# Patient Record
Sex: FEMALE | Race: WHITE | NOT HISPANIC OR LATINO | Employment: OTHER | URBAN - METROPOLITAN AREA
[De-identification: names, ages, dates, MRNs, and addresses within clinical notes are randomized per-mention and may not be internally consistent; named-entity substitution may affect disease eponyms.]

---

## 2022-06-10 ENCOUNTER — OFFICE VISIT (OUTPATIENT)
Dept: GYNECOLOGY | Facility: CLINIC | Age: 47
End: 2022-06-10
Payer: COMMERCIAL

## 2022-06-10 ENCOUNTER — LAB VISIT (OUTPATIENT)
Dept: LAB | Facility: HOSPITAL | Age: 47
End: 2022-06-10
Attending: OBSTETRICS & GYNECOLOGY
Payer: COMMERCIAL

## 2022-06-10 VITALS
SYSTOLIC BLOOD PRESSURE: 127 MMHG | DIASTOLIC BLOOD PRESSURE: 88 MMHG | OXYGEN SATURATION: 99 % | TEMPERATURE: 99 F | HEART RATE: 69 BPM | HEIGHT: 61 IN

## 2022-06-10 DIAGNOSIS — Z12.31 VISIT FOR SCREENING MAMMOGRAM: Primary | ICD-10-CM

## 2022-06-10 DIAGNOSIS — R10.2 PELVIC PAIN: ICD-10-CM

## 2022-06-10 DIAGNOSIS — Z01.419 ROUTINE GYNECOLOGICAL EXAMINATION: ICD-10-CM

## 2022-06-10 DIAGNOSIS — N95.1 MENOPAUSAL SYMPTOMS: ICD-10-CM

## 2022-06-10 LAB
ALBUMIN SERPL-MCNC: 4.3 GM/DL (ref 3.5–5)
ALBUMIN/GLOB SERPL: 1.4 RATIO (ref 1.1–2)
ALP SERPL-CCNC: 55 UNIT/L (ref 40–150)
ALT SERPL-CCNC: 21 UNIT/L (ref 0–55)
AST SERPL-CCNC: 24 UNIT/L (ref 5–34)
BASOPHILS # BLD AUTO: 0.04 X10(3)/MCL (ref 0–0.2)
BASOPHILS NFR BLD AUTO: 0.7 %
BILIRUBIN DIRECT+TOT PNL SERPL-MCNC: 0.5 MG/DL
BUN SERPL-MCNC: 15.1 MG/DL (ref 7–18.7)
CALCIUM SERPL-MCNC: 9.8 MG/DL (ref 8.4–10.2)
CHLORIDE SERPL-SCNC: 101 MMOL/L (ref 98–107)
CHOLEST SERPL-MCNC: 188 MG/DL
CHOLEST/HDLC SERPL: 2 {RATIO} (ref 0–5)
CO2 SERPL-SCNC: 28 MMOL/L (ref 22–29)
CREAT SERPL-MCNC: 0.72 MG/DL (ref 0.55–1.02)
EOSINOPHIL # BLD AUTO: 0.05 X10(3)/MCL (ref 0–0.9)
EOSINOPHIL NFR BLD AUTO: 0.9 %
ERYTHROCYTE [DISTWIDTH] IN BLOOD BY AUTOMATED COUNT: 11.7 % (ref 11.5–17)
FSH SERPL-ACNC: 29.84 MIU/ML
GLOBULIN SER-MCNC: 3.1 GM/DL (ref 2.4–3.5)
GLUCOSE SERPL-MCNC: 90 MG/DL (ref 74–100)
HCT VFR BLD AUTO: 40.7 % (ref 37–47)
HDLC SERPL-MCNC: 89 MG/DL (ref 35–60)
HGB BLD-MCNC: 13.7 GM/DL (ref 12–16)
IMM GRANULOCYTES # BLD AUTO: 0 X10(3)/MCL (ref 0–0.02)
IMM GRANULOCYTES NFR BLD AUTO: 0 % (ref 0–0.43)
LDLC SERPL CALC-MCNC: 87 MG/DL (ref 50–140)
LYMPHOCYTES # BLD AUTO: 2.09 X10(3)/MCL (ref 0.6–4.6)
LYMPHOCYTES NFR BLD AUTO: 38.7 %
MCH RBC QN AUTO: 31.3 PG (ref 27–31)
MCHC RBC AUTO-ENTMCNC: 33.7 MG/DL (ref 33–36)
MCV RBC AUTO: 92.9 FL (ref 80–94)
MONOCYTES # BLD AUTO: 0.56 X10(3)/MCL (ref 0.1–1.3)
MONOCYTES NFR BLD AUTO: 10.4 %
NEUTROPHILS # BLD AUTO: 2.7 X10(3)/MCL (ref 2.1–9.2)
NEUTROPHILS NFR BLD AUTO: 49.3 %
NRBC BLD AUTO-RTO: 0 %
PLATELET # BLD AUTO: 268 X10(3)/MCL (ref 130–400)
PMV BLD AUTO: 9.9 FL (ref 9.4–12.4)
POTASSIUM SERPL-SCNC: 4 MMOL/L (ref 3.5–5.1)
PROT SERPL-MCNC: 7.4 GM/DL (ref 6.4–8.3)
RBC # BLD AUTO: 4.38 X10(6)/MCL (ref 4.2–5.4)
SODIUM SERPL-SCNC: 141 MMOL/L (ref 136–145)
TRIGL SERPL-MCNC: 58 MG/DL (ref 37–140)
TSH SERPL-ACNC: 0.6 UIU/ML (ref 0.35–4.94)
VLDLC SERPL CALC-MCNC: 12 MG/DL
WBC # SPEC AUTO: 5.4 X10(3)/MCL (ref 4.5–11.5)

## 2022-06-10 PROCEDURE — 85025 COMPLETE CBC W/AUTO DIFF WBC: CPT

## 2022-06-10 PROCEDURE — 84443 ASSAY THYROID STIM HORMONE: CPT

## 2022-06-10 PROCEDURE — 87624 HPV HI-RISK TYP POOLED RSLT: CPT | Performed by: OBSTETRICS & GYNECOLOGY

## 2022-06-10 PROCEDURE — 83001 ASSAY OF GONADOTROPIN (FSH): CPT

## 2022-06-10 PROCEDURE — 99214 OFFICE O/P EST MOD 30 MIN: CPT | Mod: PBBFAC | Performed by: OBSTETRICS & GYNECOLOGY

## 2022-06-10 PROCEDURE — 80053 COMPREHEN METABOLIC PANEL: CPT

## 2022-06-10 PROCEDURE — 80061 LIPID PANEL: CPT

## 2022-06-10 PROCEDURE — 36415 COLL VENOUS BLD VENIPUNCTURE: CPT

## 2022-06-10 NOTE — PROGRESS NOTES
Subjective:       Patient ID: Waleska Foley is a 47 y.o. female.    Chief Complaint:  Well Woman (C/o previous symptoms coming back )    History of Present Illness:  Presents for gyn wellness visit with complaints.  She had relief from pelvic pain after lysis of adhesions involving the uterus and the anterior abdominal wall, but for 2 months has had intermittent episodes of pelvic pain which resolves spontaneously, dyspareunia and rectal pressure.  She strongly desires to avoid more surgery.  Periods are absent since the endometrial ablation. She denies abnormal bleeding, vaginal discharge, breast masses or other changes of concern.     GYN & OB History  No LMP recorded. (Menstrual status: Other).   Date of Last Pap: No result found    OB History    Para Term  AB Living   4 1     2 2   SAB IAB Ectopic Multiple Live Births   2       2      # Outcome Date GA Lbr Dain/2nd Weight Sex Delivery Anes PTL Lv   4       CS-Unspec   KENAN   3 SAB            2 SAB            1 Para      CS-Unspec   KENAN       Vitals:    06/10/22 1118   BP: 127/88   Pulse: 69   Resp: (P) 18   Temp: 98.8 °F (37.1 °C)        Past Surgical History:   Procedure Laterality Date    CHOLECYSTECTOMY      REDUCTION OF BOTH BREASTS      Hysteroscopy, surgical; with endometrial ablation (eg, endometrial resection, electrosurgical ablation, thermoablation)      Laparoscopy with lysis of adhesions        No current outpatient medications on file.     Review of patient's allergies indicates:   Allergen Reactions    Codeine Anaphylaxis    Sulfa (sulfonamide antibiotics) Anaphylaxis        Social History     Socioeconomic History    Marital status:    Tobacco Use    Smoking status: Never Smoker    Smokeless tobacco: Never Used   Substance and Sexual Activity    Alcohol use: Yes     Comment: occasionally     Drug use: Never    Sexual activity: Yes     Partners: Male        Immunization History   Administered Date(s)  Administered    Influenza - Quadrivalent - PF *Preferred* (6 months and older) 10/26/2015        There are no preventive care reminders to display for this patient.     Review of Systems  Review of Systems        Objective:    Physical Exam:   Constitutional: She is oriented to person, place, and time. She appears well-developed and well-nourished.    HENT:   Head: Normocephalic.    Eyes: Pupils are equal, round, and reactive to light. EOM are normal.    Neck: No thyromegaly present.    Cardiovascular: Normal rate, regular rhythm and normal heart sounds.    No murmur heard.   Pulmonary/Chest: Effort normal and breath sounds normal. Right breast exhibits no mass, no nipple discharge, no skin change and no tenderness. Left breast exhibits no mass, no nipple discharge, no skin change and no tenderness. Breasts are symmetrical.        Abdominal: Soft. She exhibits no distension. There is no abdominal tenderness.     Genitourinary:    Vagina and uterus normal.   The external female genitalia was normal.   Labial bartholins normal.There is no lesion on the right labia. There is no lesion on the left labia. Cervix is normal. Right adnexum displays no mass and no tenderness. Left adnexum displays no mass and no tenderness. Vagina exhibits no lesion. No erythema, rectocele, cystocele or unspecified prolapse of vaginal walls in the vagina. Cervix exhibits no lesion, no discharge, no friability, no lesion and no tenderness. Uerus contour normal  Uterus is not fixed. Normal urethral meatus.Bladder findings: no bladder tenderness          Musculoskeletal: Normal range of motion. No edema.      Lymphadenopathy:     She has no cervical adenopathy.    Neurological: She is alert and oriented to person, place, and time.    Skin: Skin is warm and dry. No rash noted.    Psychiatric: She has a normal mood and affect.          Assessment:        1. Visit for screening mammogram    2. Routine gynecological examination    3. Pelvic pain     4. Menopausal symptoms                Plan:      Problem List Items Addressed This Visit    None     Visit Diagnoses     Visit for screening mammogram    -  Primary    Relevant Orders    Mammo Digital Screening Bilat    Routine gynecological examination        Relevant Orders    Liquid-Based Pap Smear, Screening Screening    CBC Auto Differential    Comprehensive Metabolic Panel    Lipid Panel    TSH    Pelvic pain        Relevant Orders    US Pelvis Complete Non OB    Menopausal symptoms        Relevant Orders    Follicle Stimulating Hormone             Follow up in 1 year (on 6/10/2023).   Phone with lab and  Ultrasound results

## 2022-06-14 ENCOUNTER — HOSPITAL ENCOUNTER (OUTPATIENT)
Dept: RADIOLOGY | Facility: HOSPITAL | Age: 47
Discharge: HOME | End: 2022-06-14
Attending: OBSTETRICS & GYNECOLOGY
Payer: COMMERCIAL

## 2022-06-14 DIAGNOSIS — R10.2 PELVIC PAIN: ICD-10-CM

## 2022-06-14 PROCEDURE — 76856 US EXAM PELVIC COMPLETE: CPT | Mod: TC

## 2022-06-16 LAB
HPV16+18+H RISK 12 DNA CVX-IMP: NEGATIVE
INSULIN SERPL-ACNC: NORMAL U[IU]/ML
LAB AP BETHESDA CATEGORY: NORMAL
LAB AP GYN ADDITIONAL FINDINGS: NORMAL
LAB AP GYN MOLECULAR TESTING: NORMAL
LAB AP LMP DATE: NORMAL
LAB AP OCHS PAP SPECIMEN ADEQUACY: NORMAL
LAB AP OHS PAP INTERPRETATION: NORMAL
LAB AP PAP DISCLAIMER COMMENTS: NORMAL
LAB AP PAP ESTROGEN REPLACEMENT THERAPY: NORMAL

## 2022-11-17 ENCOUNTER — TELEPHONE (OUTPATIENT)
Dept: GYNECOLOGY | Facility: CLINIC | Age: 47
End: 2022-11-17
Payer: COMMERCIAL

## 2022-11-17 NOTE — TELEPHONE ENCOUNTER
----- Message from Dina Paul sent at 11/17/2022  2:39 PM CST -----  Regarding: ORDERS  Pt needs MAMMO orders sent to [facility] in [place] ASAP. Appt schd for tomorrow 11/18/22

## 2023-06-16 ENCOUNTER — OFFICE VISIT (OUTPATIENT)
Dept: GYNECOLOGY | Facility: CLINIC | Age: 48
End: 2023-06-16
Payer: COMMERCIAL

## 2023-06-16 VITALS
DIASTOLIC BLOOD PRESSURE: 79 MMHG | BODY MASS INDEX: 19.83 KG/M2 | HEART RATE: 62 BPM | TEMPERATURE: 99 F | SYSTOLIC BLOOD PRESSURE: 115 MMHG | HEIGHT: 61 IN | RESPIRATION RATE: 20 BRPM | WEIGHT: 105 LBS | OXYGEN SATURATION: 98 %

## 2023-06-16 DIAGNOSIS — Z01.419 ROUTINE GYNECOLOGICAL EXAMINATION: Primary | ICD-10-CM

## 2023-06-16 DIAGNOSIS — Z12.31 VISIT FOR SCREENING MAMMOGRAM: ICD-10-CM

## 2023-06-16 PROCEDURE — 3008F PR BODY MASS INDEX (BMI) DOCUMENTED: ICD-10-PCS | Mod: CPTII,,, | Performed by: OBSTETRICS & GYNECOLOGY

## 2023-06-16 PROCEDURE — 1159F MED LIST DOCD IN RCRD: CPT | Mod: CPTII,,, | Performed by: OBSTETRICS & GYNECOLOGY

## 2023-06-16 PROCEDURE — 3074F PR MOST RECENT SYSTOLIC BLOOD PRESSURE < 130 MM HG: ICD-10-PCS | Mod: CPTII,,, | Performed by: OBSTETRICS & GYNECOLOGY

## 2023-06-16 PROCEDURE — 3074F SYST BP LT 130 MM HG: CPT | Mod: CPTII,,, | Performed by: OBSTETRICS & GYNECOLOGY

## 2023-06-16 PROCEDURE — 1159F PR MEDICATION LIST DOCUMENTED IN MEDICAL RECORD: ICD-10-PCS | Mod: CPTII,,, | Performed by: OBSTETRICS & GYNECOLOGY

## 2023-06-16 PROCEDURE — 99396 PR PREVENTIVE VISIT,EST,40-64: ICD-10-PCS | Mod: S$PBB,,, | Performed by: OBSTETRICS & GYNECOLOGY

## 2023-06-16 PROCEDURE — 1160F PR REVIEW ALL MEDS BY PRESCRIBER/CLIN PHARMACIST DOCUMENTED: ICD-10-PCS | Mod: CPTII,,, | Performed by: OBSTETRICS & GYNECOLOGY

## 2023-06-16 PROCEDURE — 3078F DIAST BP <80 MM HG: CPT | Mod: CPTII,,, | Performed by: OBSTETRICS & GYNECOLOGY

## 2023-06-16 PROCEDURE — 99396 PREV VISIT EST AGE 40-64: CPT | Mod: S$PBB,,, | Performed by: OBSTETRICS & GYNECOLOGY

## 2023-06-16 PROCEDURE — 3008F BODY MASS INDEX DOCD: CPT | Mod: CPTII,,, | Performed by: OBSTETRICS & GYNECOLOGY

## 2023-06-16 PROCEDURE — 99214 OFFICE O/P EST MOD 30 MIN: CPT | Mod: PBBFAC | Performed by: OBSTETRICS & GYNECOLOGY

## 2023-06-16 PROCEDURE — 1160F RVW MEDS BY RX/DR IN RCRD: CPT | Mod: CPTII,,, | Performed by: OBSTETRICS & GYNECOLOGY

## 2023-06-16 PROCEDURE — 3078F PR MOST RECENT DIASTOLIC BLOOD PRESSURE < 80 MM HG: ICD-10-PCS | Mod: CPTII,,, | Performed by: OBSTETRICS & GYNECOLOGY

## 2023-06-16 NOTE — PROGRESS NOTES
Subjective:       Patient ID: Waleska Foley is a 48 y.o. female.    Chief Complaint:  annual     History of Present Illness:  Presents for gyn wellness visit without complaints.  Periods are absent.  She denies vaginal discharge, breast masses or other changes of concern. Desires screening labs.    GYN & OB History  No LMP recorded. Patient has had an ablation.   Date of Last Pap: 2022    OB History    Para Term  AB Living   5 2     2 2   SAB IAB Ectopic Multiple Live Births   2       2      # Outcome Date GA Lbr Dain/2nd Weight Sex Delivery Anes PTL Lv   5 Para            4       CS-Unspec   KENAN   3 SAB            2 SAB            1 Para      CS-Unspec   KENAN       Past Medical History:   Diagnosis Date    Antiphospholipid syndrome       Past Surgical History:   Procedure Laterality Date    CHOLECYSTECTOMY      REDUCTION OF BOTH BREASTS      Hysteroscopy, surgical; with endometrial ablation (eg, endometrial resection, electrosurgical ablation, thermoablation)      Laparoscopy with lysis of adhesions          Current Outpatient Medications:     CMPD testosterone proprionate 2% in vanicream, Apply 10 mg topically Daily., Disp: , Rfl:      Review of patient's allergies indicates:   Allergen Reactions    Codeine Anaphylaxis    Sulfa (sulfonamide antibiotics) Anaphylaxis        Social History     Socioeconomic History    Marital status:    Tobacco Use    Smoking status: Never    Smokeless tobacco: Never   Substance and Sexual Activity    Alcohol use: Not Currently    Drug use: Never    Sexual activity: Yes     Partners: Male        Immunization History   Administered Date(s) Administered    Influenza - Quadrivalent - PF *Preferred* (6 months and older) 10/26/2015        There are no preventive care reminders to display for this patient.     Review of Systems  Review of Systems       Objective:     Vitals:    23 1000   BP: 115/79   Pulse: 62   Resp: 20   Temp: 98.9 °F (37.2 °C)        Physical Exam:   Constitutional: She is oriented to person, place, and time. She appears well-developed and well-nourished.    HENT:   Head: Normocephalic.    Eyes: Pupils are equal, round, and reactive to light. EOM are normal.    Neck: No thyromegaly present.    Cardiovascular:  Normal rate, regular rhythm and normal heart sounds.            No murmur heard.   Pulmonary/Chest: Effort normal and breath sounds normal. Right breast exhibits no mass, no nipple discharge, no skin change and no tenderness. Left breast exhibits no mass, no nipple discharge, no skin change and no tenderness. Breasts are symmetrical.        Abdominal: Soft. She exhibits no distension. There is no abdominal tenderness.     Genitourinary:    Vagina and uterus normal.   The external female genitalia was normal.   Labial bartholins normal.There is no lesion on the right labia. There is no lesion on the left labia. Cervix is normal. Right adnexum displays no mass and no tenderness. Left adnexum displays no mass and no tenderness. Vagina exhibits no lesion. No erythema, rectocele, cystocele or unspecified prolapse of vaginal walls in the vagina. Cervix exhibits no lesion, no discharge, no friability, no lesion and no tenderness. Uerus contour normal  Uterus is not fixed. Normal urethral meatus.Bladder findings: no bladder tenderness          Musculoskeletal: Normal range of motion. No edema.      Lymphadenopathy:     She has no cervical adenopathy.    Neurological: She is alert and oriented to person, place, and time.    Skin: Skin is warm and dry. No rash noted.    Psychiatric: She has a normal mood and affect.        Assessment:        1. Routine gynecological examination    2. Visit for screening mammogram                Plan:      Problem List Items Addressed This Visit    None  Visit Diagnoses       Routine gynecological examination    -  Primary    Relevant Orders    CBC Auto Differential    Comprehensive Metabolic Panel    TSH    Lipid  Panel    Visit for screening mammogram        Relevant Orders    Mammo Digital Screening Bilat w/ Carlos               Follow up in 1 year (on 6/16/2024).   VOLODYMYR

## 2023-12-26 ENCOUNTER — TELEPHONE (OUTPATIENT)
Dept: GYNECOLOGY | Facility: CLINIC | Age: 48
End: 2023-12-26
Payer: COMMERCIAL

## 2023-12-26 NOTE — TELEPHONE ENCOUNTER
Tried calling patient, left detailed vm.       ----- Message from Bernarda Marvin sent at 12/26/2023  9:10 AM CST -----  Regarding: Patient call back  The patient above called because she is having pelvic pain again. She asked for Dr. Santos to give her a call if possible. Please advise, Thanks

## 2023-12-29 ENCOUNTER — TELEPHONE (OUTPATIENT)
Dept: GYNECOLOGY | Facility: CLINIC | Age: 48
End: 2023-12-29
Payer: COMMERCIAL

## 2023-12-29 NOTE — TELEPHONE ENCOUNTER
"Patient called back. C/o of chronic pelvic pain last for the past couple of months. Describes a "shooting pain" in her pelvic area. Has noticed that her lower back is also starting to hurt. Patient also mentioned a "pulling sensation" in her buttock area "on the side". Patient also c/o bladder/rectum pressure as well as urinary frequency. Denies burning or pain upon urination. Patient mentioned that Dr. Santos previously went in a removed scar tissue so she is thinking that is what's happening again.     I scheduled patient to come in for an appt on 1/4/24 at 1:20 pm. Patient states she will attend the appt.     "

## 2023-12-29 NOTE — TELEPHONE ENCOUNTER
Wants Mobic Rx  Received: Today  Muna Batista Lillian, LPN  Patient is asking for Dr. Santos to send in a rx of mobic. Ibuprofen/advil is too hard on her stomach. She has been prescribed this before. She is in severe pelvic pain, level 10.    Phone is [phone]. Please contact patient once Dr. Santos approves.

## 2024-01-02 RX ORDER — MELOXICAM 15 MG/1
15 TABLET ORAL DAILY
Qty: 7 TABLET | Refills: 0 | Status: SHIPPED | OUTPATIENT
Start: 2024-01-02

## 2024-01-04 ENCOUNTER — PATIENT MESSAGE (OUTPATIENT)
Dept: GYNECOLOGY | Facility: CLINIC | Age: 49
End: 2024-01-04
Payer: COMMERCIAL

## 2024-01-05 RX ORDER — MELOXICAM 15 MG/1
15 TABLET ORAL DAILY
Qty: 14 TABLET | Refills: 0 | Status: SHIPPED | OUTPATIENT
Start: 2024-01-05 | End: 2024-02-01 | Stop reason: SDUPTHER

## 2024-02-01 ENCOUNTER — OFFICE VISIT (OUTPATIENT)
Dept: GYNECOLOGY | Facility: CLINIC | Age: 49
End: 2024-02-01
Payer: COMMERCIAL

## 2024-02-01 VITALS
HEART RATE: 70 BPM | OXYGEN SATURATION: 100 % | BODY MASS INDEX: 20.77 KG/M2 | TEMPERATURE: 99 F | HEIGHT: 61 IN | WEIGHT: 110 LBS | SYSTOLIC BLOOD PRESSURE: 120 MMHG | RESPIRATION RATE: 18 BRPM | DIASTOLIC BLOOD PRESSURE: 77 MMHG

## 2024-02-01 DIAGNOSIS — R10.2 PELVIC PAIN: Primary | ICD-10-CM

## 2024-02-01 PROCEDURE — 1159F MED LIST DOCD IN RCRD: CPT | Mod: CPTII,,, | Performed by: OBSTETRICS & GYNECOLOGY

## 2024-02-01 PROCEDURE — 3078F DIAST BP <80 MM HG: CPT | Mod: CPTII,,, | Performed by: OBSTETRICS & GYNECOLOGY

## 2024-02-01 PROCEDURE — 99213 OFFICE O/P EST LOW 20 MIN: CPT | Mod: PBBFAC | Performed by: OBSTETRICS & GYNECOLOGY

## 2024-02-01 PROCEDURE — 3008F BODY MASS INDEX DOCD: CPT | Mod: CPTII,,, | Performed by: OBSTETRICS & GYNECOLOGY

## 2024-02-01 PROCEDURE — 3074F SYST BP LT 130 MM HG: CPT | Mod: CPTII,,, | Performed by: OBSTETRICS & GYNECOLOGY

## 2024-02-01 PROCEDURE — 99213 OFFICE O/P EST LOW 20 MIN: CPT | Mod: S$PBB,,, | Performed by: OBSTETRICS & GYNECOLOGY

## 2024-02-01 RX ORDER — PROGESTERONE 100 MG/1
100 CAPSULE ORAL NIGHTLY
COMMUNITY
Start: 2024-01-22 | End: 2024-02-09 | Stop reason: SDUPTHER

## 2024-02-01 RX ORDER — MELOXICAM 15 MG/1
15 TABLET ORAL DAILY
Qty: 30 TABLET | Refills: 0 | Status: ON HOLD | OUTPATIENT
Start: 2024-02-01 | End: 2024-02-28 | Stop reason: HOSPADM

## 2024-02-01 RX ORDER — SULFAMETHOXAZOLE AND TRIMETHOPRIM 800; 160 MG/1; MG/1
60 TABLET ORAL
COMMUNITY
Start: 2023-12-13

## 2024-02-01 NOTE — PROGRESS NOTES
Subjective:       Patient ID: Waleska Foley is a 48 y.o. female.    Chief Complaint:  Pelvic Pain    History of Present Illness:  Presents with return of lower abdominal/pelvic, hip and low back pain over the last 4-5 months.  She underwent laparoscopic lysis of a thick adhesion between the uterine fundus and the anterior abdominal wall in 2021 and did well until last September or October.  The pain is constant with intermittent exacerbation when it becomes severe.  She notes it worsens with physical activity such as exercise, standing, intercourse, etc.  She saw a therapist who employed various modalities such as dry needling, laser, etc which only provided short term relief.  She feels that she needs to return to surgery because the pain has been persistent.  She does not desire hysterectomy at this point.  She agreed to do pelvic ultrasound prior to finalizing plans.    GYN & OB History  No LMP recorded. Patient has had an ablation.   Date of Last Pap: 2022    OB History    Para Term  AB Living   5 2     2 2   SAB IAB Ectopic Multiple Live Births   2       2      # Outcome Date GA Lbr Dain/2nd Weight Sex Delivery Anes PTL Lv   5 Para            4       CS-Unspec   KENAN   3 SAB            2 SAB            1 Para      CS-Unspec   KENAN       Past Medical History:   Diagnosis Date    Antiphospholipid syndrome       Past Surgical History:   Procedure Laterality Date    CHOLECYSTECTOMY      REDUCTION OF BOTH BREASTS      Hysteroscopy, surgical; with endometrial ablation (eg, endometrial resection, electrosurgical ablation, thermoablation)      Laparoscopy with lysis of adhesions          Current Outpatient Medications:     ARMOUR THYROID 60 mg Tab, Take 60 mg by mouth., Disp: , Rfl:     progesterone (PROMETRIUM) 100 MG capsule, Take 100 mg by mouth every evening., Disp: , Rfl:     CMPD testosterone proprionate 2% in vanicream, Apply 10 mg topically Daily., Disp: , Rfl:     meloxicam  (MOBIC) 15 MG tablet, Take 1 tablet (15 mg total) by mouth once daily. (Patient not taking: Reported on 2/1/2024), Disp: 7 tablet, Rfl: 0    meloxicam (MOBIC) 15 MG tablet, Take 1 tablet (15 mg total) by mouth once daily., Disp: 30 tablet, Rfl: 0     Review of patient's allergies indicates:   Allergen Reactions    Codeine Anaphylaxis    Sulfa (sulfonamide antibiotics) Anaphylaxis        Social History     Socioeconomic History    Marital status:    Tobacco Use    Smoking status: Never    Smokeless tobacco: Never   Substance and Sexual Activity    Alcohol use: Not Currently    Drug use: Never    Sexual activity: Yes     Partners: Male        Immunization History   Administered Date(s) Administered    Influenza - Quadrivalent - PF *Preferred* (6 months and older) 10/26/2015        There are no preventive care reminders to display for this patient.     Review of Systems  Review of Systems       Objective:     Vitals:    02/01/24 1421   BP: 120/77   Pulse: 70   Resp: 18   Temp: 98.5 °F (36.9 °C)       Physical Exam      Deferred until later date/pre op     Assessment:        1. Pelvic pain                Plan:      Problem List Items Addressed This Visit    None  Visit Diagnoses       Pelvic pain    -  Primary    Relevant Orders    US Pelvis Comp with Transvag NON-OB (xpd           Medications Ordered This Encounter   Medications    meloxicam (MOBIC) 15 MG tablet     Sig: Take 1 tablet (15 mg total) by mouth once daily.     Dispense:  30 tablet     Refill:  0      No follow-ups on file.   SBE     Reviewed potential for normal findings in the pelvis if we perform another laparoscopy.  I feel she would have a good chance at long term relief with hysterectomy, but she is very hesitant.  Will revisit options and plan once ultrasound results are in.

## 2024-02-07 ENCOUNTER — HOSPITAL ENCOUNTER (OUTPATIENT)
Dept: RADIOLOGY | Facility: HOSPITAL | Age: 49
Discharge: HOME | End: 2024-02-07
Attending: OBSTETRICS & GYNECOLOGY
Payer: COMMERCIAL

## 2024-02-07 DIAGNOSIS — R10.2 PELVIC PAIN: ICD-10-CM

## 2024-02-07 PROCEDURE — 76830 TRANSVAGINAL US NON-OB: CPT | Mod: TC

## 2024-02-09 RX ORDER — PROGESTERONE 100 MG/1
200 CAPSULE ORAL NIGHTLY
Qty: 30 CAPSULE | Refills: 11 | Status: ON HOLD | OUTPATIENT
Start: 2024-02-09 | End: 2024-02-28 | Stop reason: HOSPADM

## 2024-02-09 NOTE — TELEPHONE ENCOUNTER
Reviewed pelvic ultrasound result. Findings consistent with possible pelvic congestion although she has had two similar episodes in the past, both resolved with lysis of adhesions.  Will initiate conservative management of PCS with progesterone.  She is currently on Prometrium 100 q HS. Will increase to 200 qHS. If no improvement, consider repeat laparoscopy.

## 2024-02-15 ENCOUNTER — TELEPHONE (OUTPATIENT)
Dept: GYNECOLOGY | Facility: CLINIC | Age: 49
End: 2024-02-15
Payer: COMMERCIAL

## 2024-02-15 NOTE — TELEPHONE ENCOUNTER
Pelvic ultrasound was completed. Plan was to revisit her options once ultrasound results are back. Please advise. Thank you.       ----- Message from Bernarda Marvin sent at 2/15/2024  2:19 PM CST -----  Regarding: Patient Call Back  The patient above called because she is still having pelvic pain, and is requesting a call back. Please advise, Thanks.

## 2024-02-16 ENCOUNTER — PATIENT MESSAGE (OUTPATIENT)
Dept: GYNECOLOGY | Facility: CLINIC | Age: 49
End: 2024-02-16
Payer: COMMERCIAL

## 2024-02-16 ENCOUNTER — TELEPHONE (OUTPATIENT)
Dept: GYNECOLOGY | Facility: CLINIC | Age: 49
End: 2024-02-16
Payer: COMMERCIAL

## 2024-02-16 DIAGNOSIS — R10.2 PELVIC PAIN: Primary | ICD-10-CM

## 2024-02-16 NOTE — TELEPHONE ENCOUNTER
Phoned with progressive pelvic pressure and pain with radiation down the thighs.  She has not had relief with increased dose of Prometrium and Mobic.  She has exacerbation of pain in the upright position and relief with lying down.  After a review of ultrasound findings and discussion of option to perform CT or move forward with surgery, she desires to do the latter and requests hysterectomy.  She has had two laparoscopic lysis of adhesions in the past and feels similar pain and feels like hysterectomy is the best way to reduce the risk of future surgery.  I agree that she will likely get relief with a hysterectomy, although a guarantee cannot be made.  She understands.  She requests the procedure as soon as possible.

## 2024-02-22 ENCOUNTER — LAB VISIT (OUTPATIENT)
Dept: LAB | Facility: HOSPITAL | Age: 49
End: 2024-02-22
Attending: OBSTETRICS & GYNECOLOGY
Payer: COMMERCIAL

## 2024-02-22 ENCOUNTER — OFFICE VISIT (OUTPATIENT)
Dept: GYNECOLOGY | Facility: CLINIC | Age: 49
End: 2024-02-22
Payer: COMMERCIAL

## 2024-02-22 VITALS
HEIGHT: 61 IN | DIASTOLIC BLOOD PRESSURE: 74 MMHG | BODY MASS INDEX: 22.47 KG/M2 | SYSTOLIC BLOOD PRESSURE: 109 MMHG | HEART RATE: 63 BPM | RESPIRATION RATE: 18 BRPM | WEIGHT: 119 LBS | OXYGEN SATURATION: 98 % | TEMPERATURE: 98 F

## 2024-02-22 DIAGNOSIS — R10.2 PELVIC PAIN: Primary | ICD-10-CM

## 2024-02-22 DIAGNOSIS — N94.89 PELVIC CONGESTION: ICD-10-CM

## 2024-02-22 DIAGNOSIS — R10.2 PELVIC PAIN: ICD-10-CM

## 2024-02-22 DIAGNOSIS — N94.10 DYSPAREUNIA IN FEMALE: ICD-10-CM

## 2024-02-22 LAB
ABORH RETYPE: NORMAL
BASOPHILS # BLD AUTO: 0.04 X10(3)/MCL
BASOPHILS NFR BLD AUTO: 0.7 %
EOSINOPHIL # BLD AUTO: 0.2 X10(3)/MCL (ref 0–0.9)
EOSINOPHIL NFR BLD AUTO: 3.5 %
ERYTHROCYTE [DISTWIDTH] IN BLOOD BY AUTOMATED COUNT: 12 % (ref 11.5–17)
GROUP & RH: NORMAL
HCT VFR BLD AUTO: 40.4 % (ref 37–47)
HGB BLD-MCNC: 13.5 G/DL (ref 12–16)
IMM GRANULOCYTES # BLD AUTO: 0.02 X10(3)/MCL (ref 0–0.04)
IMM GRANULOCYTES NFR BLD AUTO: 0.4 %
INDIRECT COOMBS: NORMAL
LYMPHOCYTES # BLD AUTO: 2.3 X10(3)/MCL (ref 0.6–4.6)
LYMPHOCYTES NFR BLD AUTO: 40.4 %
MCH RBC QN AUTO: 31.7 PG (ref 27–31)
MCHC RBC AUTO-ENTMCNC: 33.4 G/DL (ref 33–36)
MCV RBC AUTO: 94.8 FL (ref 80–94)
MONOCYTES # BLD AUTO: 0.51 X10(3)/MCL (ref 0.1–1.3)
MONOCYTES NFR BLD AUTO: 8.9 %
NEUTROPHILS # BLD AUTO: 2.63 X10(3)/MCL (ref 2.1–9.2)
NEUTROPHILS NFR BLD AUTO: 46.1 %
NRBC BLD AUTO-RTO: 0 %
PLATELET # BLD AUTO: 272 X10(3)/MCL (ref 130–400)
PMV BLD AUTO: 9.9 FL (ref 7.4–10.4)
RBC # BLD AUTO: 4.26 X10(6)/MCL (ref 4.2–5.4)
SPECIMEN OUTDATE: NORMAL
WBC # SPEC AUTO: 5.7 X10(3)/MCL (ref 4.5–11.5)

## 2024-02-22 PROCEDURE — 3008F BODY MASS INDEX DOCD: CPT | Mod: CPTII,,, | Performed by: OBSTETRICS & GYNECOLOGY

## 2024-02-22 PROCEDURE — 1159F MED LIST DOCD IN RCRD: CPT | Mod: CPTII,,, | Performed by: OBSTETRICS & GYNECOLOGY

## 2024-02-22 PROCEDURE — 3074F SYST BP LT 130 MM HG: CPT | Mod: CPTII,,, | Performed by: OBSTETRICS & GYNECOLOGY

## 2024-02-22 PROCEDURE — 86850 RBC ANTIBODY SCREEN: CPT | Performed by: OBSTETRICS & GYNECOLOGY

## 2024-02-22 PROCEDURE — 99214 OFFICE O/P EST MOD 30 MIN: CPT | Mod: S$PBB,,, | Performed by: OBSTETRICS & GYNECOLOGY

## 2024-02-22 PROCEDURE — 3078F DIAST BP <80 MM HG: CPT | Mod: CPTII,,, | Performed by: OBSTETRICS & GYNECOLOGY

## 2024-02-22 PROCEDURE — 99213 OFFICE O/P EST LOW 20 MIN: CPT | Mod: PBBFAC,25 | Performed by: OBSTETRICS & GYNECOLOGY

## 2024-02-22 PROCEDURE — 36415 COLL VENOUS BLD VENIPUNCTURE: CPT

## 2024-02-22 RX ORDER — OXYCODONE AND ACETAMINOPHEN 5; 325 MG/1; MG/1
1 TABLET ORAL EVERY 4 HOURS PRN
Qty: 15 TABLET | Refills: 0 | Status: SHIPPED | OUTPATIENT
Start: 2024-02-22 | End: 2024-05-31

## 2024-02-22 RX ORDER — ONDANSETRON 4 MG/1
4 TABLET, FILM COATED ORAL EVERY 6 HOURS PRN
Qty: 12 TABLET | Refills: 1 | Status: SHIPPED | OUTPATIENT
Start: 2024-02-22

## 2024-02-23 RX ORDER — LIDOCAINE HYDROCHLORIDE 10 MG/ML
1 INJECTION, SOLUTION EPIDURAL; INFILTRATION; INTRACAUDAL; PERINEURAL ONCE
Status: CANCELLED | OUTPATIENT
Start: 2024-02-23 | End: 2024-02-23

## 2024-02-23 RX ORDER — FAMOTIDINE 20 MG/1
20 TABLET, FILM COATED ORAL
Status: CANCELLED | OUTPATIENT
Start: 2024-02-23

## 2024-02-23 RX ORDER — SODIUM CHLORIDE 9 MG/ML
INJECTION, SOLUTION INTRAVENOUS CONTINUOUS
Status: CANCELLED | OUTPATIENT
Start: 2024-02-23

## 2024-02-23 NOTE — H&P
Subjective:      Patient ID: Waleska Foley is a 49 y.o. female.    Chief Complaint:  Pre-op Exam      History of Present Illness  48 y/o  with a history of c/s X 2 and laparoscopy X 2 for lysis of adhesions with return of lower abdominal/pelvic, hip and low back pain over the last 4-5 months.  She underwent laparoscopic lysis of a thick adhesion between the uterine fundus and the anterior abdominal wall in 2021 and did well until last September or October.  The pain is constant with intermittent exacerbation when it becomes severe.  She notes it worsens with physical activity such as exercise, standing, intercourse, etc.  She saw a therapist who employed various modalities such as dry needling, laser, etc which only provided short term relief.  since return of pain in early February, the symptoms have intensified  and her activity is limited.  The pelvic pain and pressure are exacerbated in the upright position and better when supine. Pain is greater on the right than the left, but is bilateral.    Pelvic ultrasound:    The uterus measures 6.3 x 3.1 x 3.8 cm.  There appears to be varices within the myometrium uterus.  These were measured as cyst however there does appear to be flow demonstrated within a single image within these.     The right ovary measures 3.9 x 2.8 x 3.1 cm.  There is flow present.     The left ovary measures 3 x 2.3 x 2.4 cm.  There is a cyst present measuring 2.2 x 2 x 2.1 cm.  This is mildly increased in size from the previous study.  There are varices seen in either adnexa.     Impression:     Varices in either adnexa.     Left ovarian cyst.     Questionable cyst versus varices within the myometrium.        Electronically signed by: Carmine Escamilla MD  Date:                                            2024  Time:                                           09:53    She was treated with progesterone for pelvic congestion and she has had no improvement in symptoms. She desires to  proceed with definitive therapy in the form of total laparoscopic hysterectomy, bilateral salpingectomy, cystoscopy.        GYN & OB History  No LMP recorded. Patient has had an ablation.   Date of Last Pap: 2022    OB History    Para Term  AB Living   5 2     2 2   SAB IAB Ectopic Multiple Live Births   2       2      # Outcome Date GA Lbr Dain/2nd Weight Sex Delivery Anes PTL Lv   5 Para            4       CS-Unspec   KENAN   3 SAB            2 SAB            1 Para      CS-Unspec   KENAN       Review of Systems  Review of Systems   Constitutional:  Positive for activity change and fatigue. Negative for appetite change, chills and fever.   Respiratory:  Negative for cough.    Cardiovascular:  Negative for chest pain and palpitations.   Gastrointestinal:  Positive for abdominal pain. Negative for bloating, blood in stool, constipation, diarrhea and nausea.   Genitourinary:  Positive for bladder incontinence, dyspareunia, frequency and pelvic pain. Negative for dysuria, flank pain and hot flashes.   Musculoskeletal:  Negative for arthralgias and joint swelling.   Integumentary:  Negative for rash.          Objective:     Physical Exam:   Constitutional: She is oriented to person, place, and time. She appears well-developed and well-nourished. She appears distressed.    HENT:   Head: Normocephalic and atraumatic.    Eyes: Pupils are equal, round, and reactive to light.    Neck: No thyromegaly present.    Cardiovascular:  Normal rate, regular rhythm and normal heart sounds.             Pulmonary/Chest: Effort normal and breath sounds normal.        Abdominal: Soft. She exhibits no mass. There is abdominal tenderness. There is no rebound and no guarding. No hernia. Hernia confirmed negative in the right inguinal area and confirmed negative in the left inguinal area.     Genitourinary:    Vagina, right adnexa and left adnexa normal.      Pelvic exam was performed with patient in the lithotomy  position.   The external female genitalia was normal.     Labial bartholins normal.There is no tenderness or lesion on the right labia. There is tenderness on the left labia. There is no lesion on the left labia. Cervix is normal. Right adnexum displays no tenderness and no fullness. Left adnexum displays no tenderness and no fullness. Vagina exhibits no lesion. No vaginal discharge, bleeding, rectocele or cystocele in the vagina. Uerus contour normal  Uterus is tender. Uterus is not hosting fibroids and no uterine prolapse. Normal urethral meatus.          Musculoskeletal: Normal range of motion.       Neurological: She is alert and oriented to person, place, and time.    Skin: Skin is warm and dry.    Psychiatric: Judgment and thought content normal.         Assessment:     1. Pelvic pain    2. Pelvic congestion    3. Dyspareunia in female          Plan:     Total Laparoscopic Hysterectomy, bilateral salpingectomy, cystoscopy, possible oophorectomy, possible conversion to vaginal or abdominal approach.   Questions encouraged and answered.  She desires ovarian preservation in the case of normal appearing, nonadherent ovaries

## 2024-02-27 ENCOUNTER — ANESTHESIA EVENT (OUTPATIENT)
Dept: SURGERY | Facility: HOSPITAL | Age: 49
End: 2024-02-27
Payer: COMMERCIAL

## 2024-02-28 ENCOUNTER — HOSPITAL ENCOUNTER (OUTPATIENT)
Facility: HOSPITAL | Age: 49
Discharge: HOME | End: 2024-02-28
Attending: OBSTETRICS & GYNECOLOGY | Admitting: OBSTETRICS & GYNECOLOGY
Payer: COMMERCIAL

## 2024-02-28 ENCOUNTER — ANESTHESIA (OUTPATIENT)
Dept: SURGERY | Facility: HOSPITAL | Age: 49
End: 2024-02-28
Payer: COMMERCIAL

## 2024-02-28 DIAGNOSIS — R10.2 PELVIC PAIN: ICD-10-CM

## 2024-02-28 DIAGNOSIS — N94.89 PELVIC CONGESTION SYNDROME: Primary | ICD-10-CM

## 2024-02-28 DIAGNOSIS — N94.89 PELVIC CONGESTION: ICD-10-CM

## 2024-02-28 DIAGNOSIS — N94.10 DYSPAREUNIA IN FEMALE: ICD-10-CM

## 2024-02-28 LAB
B-HCG UR QL: NEGATIVE
CTP QC/QA: YES

## 2024-02-28 PROCEDURE — 81025 URINE PREGNANCY TEST: CPT | Performed by: OBSTETRICS & GYNECOLOGY

## 2024-02-28 PROCEDURE — 63600175 PHARM REV CODE 636 W HCPCS: Performed by: OBSTETRICS & GYNECOLOGY

## 2024-02-28 PROCEDURE — 27201423 OPTIME MED/SURG SUP & DEVICES STERILE SUPPLY: Performed by: OBSTETRICS & GYNECOLOGY

## 2024-02-28 PROCEDURE — 71000015 HC POSTOP RECOV 1ST HR: Performed by: OBSTETRICS & GYNECOLOGY

## 2024-02-28 PROCEDURE — 36000711: Performed by: OBSTETRICS & GYNECOLOGY

## 2024-02-28 PROCEDURE — 25000003 PHARM REV CODE 250: Performed by: OBSTETRICS & GYNECOLOGY

## 2024-02-28 PROCEDURE — D9220A PRA ANESTHESIA: Mod: ANES,,, | Performed by: ANESTHESIOLOGY

## 2024-02-28 PROCEDURE — 71000033 HC RECOVERY, INTIAL HOUR: Performed by: OBSTETRICS & GYNECOLOGY

## 2024-02-28 PROCEDURE — D9220A PRA ANESTHESIA: Mod: CRNA,,,

## 2024-02-28 PROCEDURE — 37000009 HC ANESTHESIA EA ADD 15 MINS: Performed by: OBSTETRICS & GYNECOLOGY

## 2024-02-28 PROCEDURE — 37000008 HC ANESTHESIA 1ST 15 MINUTES: Performed by: OBSTETRICS & GYNECOLOGY

## 2024-02-28 PROCEDURE — C2628 CATHETER, OCCLUSION: HCPCS | Performed by: OBSTETRICS & GYNECOLOGY

## 2024-02-28 PROCEDURE — 71000016 HC POSTOP RECOV ADDL HR: Performed by: OBSTETRICS & GYNECOLOGY

## 2024-02-28 PROCEDURE — 63600175 PHARM REV CODE 636 W HCPCS: Performed by: ANESTHESIOLOGY

## 2024-02-28 PROCEDURE — 25000003 PHARM REV CODE 250

## 2024-02-28 PROCEDURE — 63600175 PHARM REV CODE 636 W HCPCS

## 2024-02-28 PROCEDURE — 25000003 PHARM REV CODE 250: Performed by: ANESTHESIOLOGY

## 2024-02-28 PROCEDURE — 36000710: Performed by: OBSTETRICS & GYNECOLOGY

## 2024-02-28 RX ORDER — HYDROMORPHONE HYDROCHLORIDE 2 MG/ML
1 INJECTION, SOLUTION INTRAMUSCULAR; INTRAVENOUS; SUBCUTANEOUS EVERY 6 HOURS PRN
Status: CANCELLED | OUTPATIENT
Start: 2024-02-28

## 2024-02-28 RX ORDER — ACETAMINOPHEN 10 MG/ML
15 INJECTION, SOLUTION INTRAVENOUS ONCE
Status: COMPLETED | OUTPATIENT
Start: 2024-02-28 | End: 2024-02-28

## 2024-02-28 RX ORDER — EPHEDRINE SULFATE 50 MG/ML
INJECTION, SOLUTION INTRAVENOUS
Status: DISCONTINUED | OUTPATIENT
Start: 2024-02-28 | End: 2024-02-28

## 2024-02-28 RX ORDER — LIDOCAINE HYDROCHLORIDE 10 MG/ML
1 INJECTION, SOLUTION EPIDURAL; INFILTRATION; INTRACAUDAL; PERINEURAL ONCE
Status: CANCELLED | OUTPATIENT
Start: 2024-02-28 | End: 2024-02-28

## 2024-02-28 RX ORDER — BUPIVACAINE HYDROCHLORIDE 5 MG/ML
INJECTION, SOLUTION EPIDURAL; INTRACAUDAL
Status: DISCONTINUED | OUTPATIENT
Start: 2024-02-28 | End: 2024-02-28 | Stop reason: HOSPADM

## 2024-02-28 RX ORDER — DIPHENHYDRAMINE HCL 25 MG
25 CAPSULE ORAL EVERY 4 HOURS PRN
Status: CANCELLED | OUTPATIENT
Start: 2024-02-28

## 2024-02-28 RX ORDER — KETOROLAC TROMETHAMINE 30 MG/ML
15 INJECTION, SOLUTION INTRAMUSCULAR; INTRAVENOUS EVERY 8 HOURS
Status: CANCELLED | OUTPATIENT
Start: 2024-02-28 | End: 2024-02-29

## 2024-02-28 RX ORDER — ONDANSETRON HYDROCHLORIDE 2 MG/ML
4 INJECTION, SOLUTION INTRAVENOUS DAILY PRN
Status: DISCONTINUED | OUTPATIENT
Start: 2024-02-28 | End: 2024-02-28 | Stop reason: HOSPADM

## 2024-02-28 RX ORDER — FUROSEMIDE 10 MG/ML
INJECTION INTRAMUSCULAR; INTRAVENOUS
Status: DISCONTINUED
Start: 2024-02-28 | End: 2024-02-28 | Stop reason: WASHOUT

## 2024-02-28 RX ORDER — PROCHLORPERAZINE EDISYLATE 5 MG/ML
5 INJECTION INTRAMUSCULAR; INTRAVENOUS EVERY 30 MIN PRN
Status: DISCONTINUED | OUTPATIENT
Start: 2024-02-28 | End: 2024-02-28 | Stop reason: HOSPADM

## 2024-02-28 RX ORDER — MEPERIDINE HYDROCHLORIDE 25 MG/ML
12.5 INJECTION INTRAMUSCULAR; INTRAVENOUS; SUBCUTANEOUS EVERY 10 MIN PRN
Status: DISCONTINUED | OUTPATIENT
Start: 2024-02-28 | End: 2024-02-28 | Stop reason: HOSPADM

## 2024-02-28 RX ORDER — GLYCOPYRROLATE 0.2 MG/ML
INJECTION INTRAMUSCULAR; INTRAVENOUS
Status: DISCONTINUED | OUTPATIENT
Start: 2024-02-28 | End: 2024-02-28

## 2024-02-28 RX ORDER — FENTANYL CITRATE 50 UG/ML
INJECTION, SOLUTION INTRAMUSCULAR; INTRAVENOUS
Status: DISCONTINUED | OUTPATIENT
Start: 2024-02-28 | End: 2024-02-28

## 2024-02-28 RX ORDER — GABAPENTIN 300 MG/1
600 CAPSULE ORAL ONCE
Status: COMPLETED | OUTPATIENT
Start: 2024-02-28 | End: 2024-02-28

## 2024-02-28 RX ORDER — BISACODYL 10 MG/1
10 SUPPOSITORY RECTAL DAILY PRN
Status: CANCELLED | OUTPATIENT
Start: 2024-02-28

## 2024-02-28 RX ORDER — MIDAZOLAM HYDROCHLORIDE 1 MG/ML
2 INJECTION INTRAMUSCULAR; INTRAVENOUS ONCE AS NEEDED
Status: COMPLETED | OUTPATIENT
Start: 2024-02-28 | End: 2024-02-28

## 2024-02-28 RX ORDER — ROCURONIUM BROMIDE 10 MG/ML
INJECTION, SOLUTION INTRAVENOUS
Status: DISCONTINUED | OUTPATIENT
Start: 2024-02-28 | End: 2024-02-28

## 2024-02-28 RX ORDER — OXYCODONE AND ACETAMINOPHEN 5; 325 MG/1; MG/1
1 TABLET ORAL EVERY 4 HOURS PRN
Status: CANCELLED | OUTPATIENT
Start: 2024-02-28

## 2024-02-28 RX ORDER — PROPOFOL 10 MG/ML
VIAL (ML) INTRAVENOUS
Status: DISCONTINUED | OUTPATIENT
Start: 2024-02-28 | End: 2024-02-28

## 2024-02-28 RX ORDER — FAMOTIDINE 20 MG/1
20 TABLET, FILM COATED ORAL
Status: COMPLETED | OUTPATIENT
Start: 2024-02-28 | End: 2024-02-28

## 2024-02-28 RX ORDER — IPRATROPIUM BROMIDE AND ALBUTEROL SULFATE 2.5; .5 MG/3ML; MG/3ML
3 SOLUTION RESPIRATORY (INHALATION)
Status: DISCONTINUED | OUTPATIENT
Start: 2024-02-28 | End: 2024-02-28 | Stop reason: HOSPADM

## 2024-02-28 RX ORDER — OXYCODONE AND ACETAMINOPHEN 10; 325 MG/1; MG/1
1 TABLET ORAL EVERY 4 HOURS PRN
Status: CANCELLED | OUTPATIENT
Start: 2024-02-28

## 2024-02-28 RX ORDER — DIPHENHYDRAMINE HYDROCHLORIDE 50 MG/ML
25 INJECTION INTRAMUSCULAR; INTRAVENOUS EVERY 4 HOURS PRN
Status: CANCELLED | OUTPATIENT
Start: 2024-02-28

## 2024-02-28 RX ORDER — SODIUM CHLORIDE, SODIUM LACTATE, POTASSIUM CHLORIDE, CALCIUM CHLORIDE 600; 310; 30; 20 MG/100ML; MG/100ML; MG/100ML; MG/100ML
INJECTION, SOLUTION INTRAVENOUS CONTINUOUS
Status: DISCONTINUED | OUTPATIENT
Start: 2024-02-28 | End: 2024-02-28 | Stop reason: HOSPADM

## 2024-02-28 RX ORDER — DEXAMETHASONE SODIUM PHOSPHATE 4 MG/ML
INJECTION, SOLUTION INTRA-ARTICULAR; INTRALESIONAL; INTRAMUSCULAR; INTRAVENOUS; SOFT TISSUE
Status: DISCONTINUED | OUTPATIENT
Start: 2024-02-28 | End: 2024-02-28

## 2024-02-28 RX ORDER — ONDANSETRON HYDROCHLORIDE 2 MG/ML
INJECTION, SOLUTION INTRAMUSCULAR; INTRAVENOUS
Status: DISCONTINUED | OUTPATIENT
Start: 2024-02-28 | End: 2024-02-28

## 2024-02-28 RX ORDER — LIDOCAINE HYDROCHLORIDE 10 MG/ML
INJECTION, SOLUTION EPIDURAL; INFILTRATION; INTRACAUDAL; PERINEURAL
Status: DISCONTINUED | OUTPATIENT
Start: 2024-02-28 | End: 2024-02-28

## 2024-02-28 RX ORDER — SCOLOPAMINE TRANSDERMAL SYSTEM 1 MG/1
1 PATCH, EXTENDED RELEASE TRANSDERMAL ONCE
Status: DISCONTINUED | OUTPATIENT
Start: 2024-02-28 | End: 2024-02-28 | Stop reason: HOSPADM

## 2024-02-28 RX ORDER — HYDROMORPHONE HYDROCHLORIDE 2 MG/ML
0.4 INJECTION, SOLUTION INTRAMUSCULAR; INTRAVENOUS; SUBCUTANEOUS EVERY 5 MIN PRN
Status: DISCONTINUED | OUTPATIENT
Start: 2024-02-28 | End: 2024-02-28 | Stop reason: HOSPADM

## 2024-02-28 RX ORDER — CEFAZOLIN SODIUM 1 G/3ML
2 INJECTION, POWDER, FOR SOLUTION INTRAMUSCULAR; INTRAVENOUS
Status: DISCONTINUED | OUTPATIENT
Start: 2024-02-28 | End: 2024-02-28 | Stop reason: HOSPADM

## 2024-02-28 RX ORDER — SODIUM CHLORIDE, SODIUM GLUCONATE, SODIUM ACETATE, POTASSIUM CHLORIDE AND MAGNESIUM CHLORIDE 30; 37; 368; 526; 502 MG/100ML; MG/100ML; MG/100ML; MG/100ML; MG/100ML
INJECTION, SOLUTION INTRAVENOUS CONTINUOUS
Status: CANCELLED | OUTPATIENT
Start: 2024-02-28 | End: 2024-03-29

## 2024-02-28 RX ORDER — ONDANSETRON 4 MG/1
8 TABLET, ORALLY DISINTEGRATING ORAL EVERY 8 HOURS PRN
Status: DISCONTINUED | OUTPATIENT
Start: 2024-02-28 | End: 2024-02-28 | Stop reason: HOSPADM

## 2024-02-28 RX ORDER — SODIUM CHLORIDE, SODIUM LACTATE, POTASSIUM CHLORIDE, CALCIUM CHLORIDE 600; 310; 30; 20 MG/100ML; MG/100ML; MG/100ML; MG/100ML
INJECTION, SOLUTION INTRAVENOUS CONTINUOUS
Status: CANCELLED | OUTPATIENT
Start: 2024-02-28

## 2024-02-28 RX ORDER — DEXMEDETOMIDINE HYDROCHLORIDE 100 UG/ML
INJECTION, SOLUTION INTRAVENOUS
Status: DISCONTINUED | OUTPATIENT
Start: 2024-02-28 | End: 2024-02-28

## 2024-02-28 RX ORDER — HYDROMORPHONE HYDROCHLORIDE 2 MG/ML
0.2 INJECTION, SOLUTION INTRAMUSCULAR; INTRAVENOUS; SUBCUTANEOUS EVERY 5 MIN PRN
Status: DISCONTINUED | OUTPATIENT
Start: 2024-02-28 | End: 2024-02-28 | Stop reason: HOSPADM

## 2024-02-28 RX ORDER — ONDANSETRON 4 MG/1
8 TABLET, ORALLY DISINTEGRATING ORAL EVERY 6 HOURS PRN
Status: DISCONTINUED | OUTPATIENT
Start: 2024-02-28 | End: 2024-02-28 | Stop reason: HOSPADM

## 2024-02-28 RX ORDER — MUPIROCIN 20 MG/G
OINTMENT TOPICAL 2 TIMES DAILY
Status: CANCELLED | OUTPATIENT
Start: 2024-02-28 | End: 2024-03-02

## 2024-02-28 RX ORDER — BUPIVACAINE HYDROCHLORIDE 5 MG/ML
INJECTION, SOLUTION EPIDURAL; INTRACAUDAL
Status: DISCONTINUED
Start: 2024-02-28 | End: 2024-02-28 | Stop reason: HOSPADM

## 2024-02-28 RX ADMIN — ONDANSETRON 4 MG: 2 INJECTION INTRAMUSCULAR; INTRAVENOUS at 11:02

## 2024-02-28 RX ADMIN — LIDOCAINE HYDROCHLORIDE 50 MG: 10 INJECTION, SOLUTION EPIDURAL; INFILTRATION; INTRACAUDAL; PERINEURAL at 10:02

## 2024-02-28 RX ADMIN — FAMOTIDINE 20 MG: 20 TABLET, FILM COATED ORAL at 08:02

## 2024-02-28 RX ADMIN — GLYCOPYRROLATE 0.2 MG: 0.2 INJECTION INTRAMUSCULAR; INTRAVENOUS at 10:02

## 2024-02-28 RX ADMIN — DEXMEDETOMIDINE 6 MCG: 200 INJECTION, SOLUTION INTRAVENOUS at 11:02

## 2024-02-28 RX ADMIN — CEFAZOLIN 2 G: 330 INJECTION, POWDER, FOR SOLUTION INTRAMUSCULAR; INTRAVENOUS at 10:02

## 2024-02-28 RX ADMIN — FENTANYL CITRATE 100 MCG: 50 INJECTION, SOLUTION INTRAMUSCULAR; INTRAVENOUS at 10:02

## 2024-02-28 RX ADMIN — ROCURONIUM BROMIDE 50 MG: 50 INJECTION INTRAVENOUS at 10:02

## 2024-02-28 RX ADMIN — FENTANYL CITRATE 25 MCG: 50 INJECTION, SOLUTION INTRAMUSCULAR; INTRAVENOUS at 01:02

## 2024-02-28 RX ADMIN — DEXMEDETOMIDINE 4 MCG: 200 INJECTION, SOLUTION INTRAVENOUS at 12:02

## 2024-02-28 RX ADMIN — DEXAMETHASONE SODIUM PHOSPHATE 8 MG: 4 INJECTION, SOLUTION INTRA-ARTICULAR; INTRALESIONAL; INTRAMUSCULAR; INTRAVENOUS; SOFT TISSUE at 10:02

## 2024-02-28 RX ADMIN — PROPOFOL 150 MG: 10 INJECTION, EMULSION INTRAVENOUS at 10:02

## 2024-02-28 RX ADMIN — EPHEDRINE SULFATE 5 MG: 50 INJECTION INTRAVENOUS at 12:02

## 2024-02-28 RX ADMIN — MIDAZOLAM HYDROCHLORIDE 2 MG: 1 INJECTION, SOLUTION INTRAMUSCULAR; INTRAVENOUS at 10:02

## 2024-02-28 RX ADMIN — SUGAMMADEX 200 MG: 100 INJECTION, SOLUTION INTRAVENOUS at 12:02

## 2024-02-28 RX ADMIN — SODIUM CHLORIDE, POTASSIUM CHLORIDE, SODIUM LACTATE AND CALCIUM CHLORIDE: 600; 310; 30; 20 INJECTION, SOLUTION INTRAVENOUS at 08:02

## 2024-02-28 RX ADMIN — EPHEDRINE SULFATE 5 MG: 50 INJECTION INTRAVENOUS at 11:02

## 2024-02-28 RX ADMIN — SCOPOLAMINE 1 PATCH: 1 PATCH TRANSDERMAL at 10:02

## 2024-02-28 RX ADMIN — GABAPENTIN 600 MG: 300 CAPSULE ORAL at 08:02

## 2024-02-28 RX ADMIN — SODIUM CHLORIDE, POTASSIUM CHLORIDE, SODIUM LACTATE AND CALCIUM CHLORIDE: 600; 310; 30; 20 INJECTION, SOLUTION INTRAVENOUS at 11:02

## 2024-02-28 RX ADMIN — ACETAMINOPHEN 750 MG: 10 INJECTION, SOLUTION INTRAVENOUS at 08:02

## 2024-02-28 NOTE — TRANSFER OF CARE
Anesthesia Transfer of Care Note    Patient: Waleska Foley    Procedure(s) Performed: Procedure(s) (LRB):  HYSTERECTOMY, TOTAL, LAPAROSCOPIC   /  Stratafix  suture for vaginal cuff (N/A)    Patient location: PACU    Anesthesia Type: general    Transport from OR: Transported from OR on room air with adequate spontaneous ventilation    Post pain: adequate analgesia    Post assessment: no apparent anesthetic complications and tolerated procedure well    Post vital signs: stable    Level of consciousness: responds to stimulation    Nausea/Vomiting: no nausea/vomiting    Complications: none    Transfer of care protocol was followed      Last vitals:

## 2024-02-28 NOTE — ANESTHESIA PROCEDURE NOTES
Intubation    Date/Time: 2/28/2024 10:49 AM    Performed by: Sheri Garcia CRNA  Authorized by: Jose Luis Sy Jr., MD    Intubation:     Induction:  Intravenous    Intubated:  Postinduction    Mask Ventilation:  Easy mask    Attempts:  1    Attempted By:  CRNA    Method of Intubation:  Direct    Blade:  Junior 2    Laryngeal View Grade: Grade IIA - cords partially seen      Difficult Airway Encountered?: No      Complications:  None    Airway Device:  Oral endotracheal tube    Airway Device Size:  6.5    Style/Cuff Inflation:  Cuffed (inflated to minimal occlusive pressure)    Inflation Amount (mL):  6    Tube secured:  20    Secured at:  The lips    Placement Verified By:  Capnometry    Complicating Factors:  None    Findings Post-Intubation:  BS equal bilateral

## 2024-02-28 NOTE — ANESTHESIA POSTPROCEDURE EVALUATION
Anesthesia Post Evaluation    Patient: Waleska Foley    Procedure(s) Performed: Procedure(s) (LRB):  HYSTERECTOMY, TOTAL, LAPAROSCOPIC   /  Stratafix  suture for vaginal cuff (N/A)    Final Anesthesia Type: general      Patient location during evaluation: PACU  Patient participation: Yes- Able to Participate  Level of consciousness: awake and alert  Post-procedure vital signs: reviewed and stable  Pain management: adequate  Airway patency: patent    PONV status at discharge: No PONV  Anesthetic complications: no      Cardiovascular status: blood pressure returned to baseline  Respiratory status: spontaneous ventilation and room air  Hydration status: euvolemic  Follow-up not needed.              Vitals Value Taken Time   /72 02/28/24 1401   Temp 36.5 °C (97.7 °F) 02/28/24 1350   Pulse 76 02/28/24 1401   Resp 17 02/28/24 1351   SpO2 100 % 02/28/24 1401   Vitals shown include unvalidated device data.      Event Time   Out of Recovery 13:58:00         Pain/Nathan Score: Pain Rating Prior to Med Admin: 0 (2/28/2024  9:07 AM)  Nathan Score: 10 (2/28/2024  2:05 PM)

## 2024-02-28 NOTE — ANESTHESIA PREPROCEDURE EVALUATION
"                                                                                                             02/28/2024  Waleska Foley is a 49 y.o., female with PONV presents as an outpatient for laparoscopic hysterectomy (pelvic pain).    Last 3 sets of Vitals        2/17/2024     2:48 PM 2/22/2024     3:22 PM 2/28/2024     8:34 AM   Vitals - 1 value per visit   SYSTOLIC  109 131   DIASTOLIC  74 79   Pulse  63 66   Temp  36.7 °C (98 °F) 37 °C (98.6 °F)   Resp  18 16   SPO2  98 % 100 %   Weight (lb) 110 119    Weight (kg) 49.896 53.978    Height 5' 1" (1.549 m) 5' 1" (1.549 m)    BMI (Calculated) 20.8 22.5          Lab Results   Component Value Date    WBC 5.70 02/22/2024    HGB 13.5 02/22/2024    HCT 40.4 02/22/2024    MCV 94.8 (H) 02/22/2024     02/22/2024   Pre-op Assessment    I have reviewed the Patient Summary Reports.    I have reviewed the NPO Status.   I have reviewed the Medications.     Review of Systems  Anesthesia Hx:               Denies Personal Hx of Anesthesia complications.                    Social:  Non-Smoker       Cardiovascular:   Functional Capacity good / => 4 METS                             Physical Exam  General: Well nourished, Cooperative, Alert and Oriented    Airway:  Mallampati: II   Mouth Opening: Normal  TM Distance: Normal  Tongue: Normal  Neck ROM: Normal ROM    Dental:  Intact    Chest/Lungs:  Clear to auscultation, Normal Respiratory Rate    Heart:  Rate: Normal  Rhythm: Regular Rhythm        Anesthesia Plan  Type of Anesthesia, risks & benefits discussed:    Anesthesia Type: Gen ETT  Intra-op Monitoring Plan: Standard ASA Monitors  Post Op Pain Control Plan: multimodal analgesia and IV/PO Opioids PRN  Induction:  IV  Airway Plan: Direct  Informed Consent: Informed consent signed with the Patient and all parties understand the risks and agree with anesthesia plan.  All questions answered.   ASA Score: 2  Day of Surgery Review of History & Physical: H&P Update referred " to the surgeon/provider.    Ready For Surgery From Anesthesia Perspective.     .

## 2024-02-28 NOTE — OP NOTE
Pre Op Dx- Pelvic pain, Pelvic congestion syndrome  Post Op Dx- Same  Procedure- Total laparoscopic hysterectomy, bilateral salpingectomy, left ovarian cystectomy, cystoscopy  Surgeon- Rigo Santos MD  Assist- Geneva Navarro MD  Findings- Hyperemic pelvic peritoneum. Normal uterus, tubes, right ovary. Left ovary with 3 cm simple cyst, normal cul de sac, sigmoid colon, visualized portion of the small bowel and appendix. There was no evidence of endometriosis or adhesions.  Complications- None  EBL- 75 ml      With informed consent, the patient was taken to the operative suite and placed in the supine position.  General endotracheal anesthesia was administered.  She was converted to the lithotomy position in adjustable stirrups.  She was prepped and draped in the usual fashion.  Examination under anesthesia was performed and confirmed the preoperative findings.  A Harrell catheter was placed using aseptic technique.  The cervix was grasped on the anterior lip with a single-tooth tenaculum and the uterus was sounded to 7 cm.  Sutures were placed at the 3 and 9 o'clock position on the cervix.  The appropriate size koh ring was selected for the SELMA uterine manipulator and the 6 cm tip was selected.  The SELMA was assembled and the tip inserted through the os into the endometrial cavity.  The tip balloon was then inflated.  The vaginal occluder was inflated and the manipulator was draped sterilely.  The over gloves were removed, the legs were lowered and attention was turned to the abdomen where 0.5% Marcaine without epinephrine was injected at the trocar sites.  A 5 mm subumbilical incision was made, the anterior abdominal wall was elevated and a Veres needle was inserted into the peritoneal cavity. After a positive saline test, carbon dioxide pneumoperitoneum was achieved to a pressure of 15 mmHg.  A 5 mm laparoscope an an optical trochar were inserted through the incision. Right and left lower 5 mm ports were placed  under direct visualization superior and medial to the ASIS, and a left mid 11 mm port was placed.  The patient was placed in Trendelenburg position and pictures were taken of the pelvic cavity and the abdominal cavity.  There was no evidence of injury to structures underlying the trocars.  The fimbria of the fallopian tubes were elevated and using the Harmonic scalpel the mesosalpinx was cauterized and transected.  The round ligaments were transected followed by the utero-ovarian ligament.  The left anterior leaf of the broad ligament was dissected anteriorly creating a bladder flap.  Once the vesicouterine peritoneum and bladder had been adequately dissected away from the cervix the uterine artery on the left was skeletonized, made hemostatic with the bipolar cautery and transected with the Harmonic scalpel.  Attention was turned to the right side where the mesosalpinx, round ligament, ovarian ligament, and anterior leaf of the broad ligament were dissected.  The uterine artery was skeletonized, cauterized, and transected.  Dissection was continued medial to the uterine artery pedicles and adjacent to the cervix.  A colpotomy was performed anteriorly after ensuring that the bladder was adequately dissected from the upper vagina.  Circumferential dissection was performed freeing the cervical attachment to the vagina.  The specimen was removed vaginally.  Carbon dioxide pneumoperitoneum was reestablished and the pelvis was irrigated and suctioned until clear.  The vaginal cuff was closed with 0 Stratafix suture incorporating the uterosacral ligaments to the ipsilateral vaginal cuff angles.  Hemostasis was achieved with bipolar cautery.  The harmonic scalpel was use to excise the simple left ovarian cyst which ruptured releasing straw colored fluid.  Cystoscopy with saline distension was performed after removing the Harrell.  There was no evidence of injury to the bladder nor distortion of the bladder.  There was  bilateral efflux of clear urine from the ureters.  The cystoscope was removed.  The carbon dioxide was allowed to escape from the peritoneal cavity and the trocar sleeves were removed.  The skin was reapproximated with 4-0 Monocryl in a subcuticular fashion at all trocar sites.  Counts were correct x2.  The wounds were dressed, the patient was placed in the supine position, awakened and transferred to recovery in stable condition.

## 2024-02-29 NOTE — DISCHARGE SUMMARY
[facility] - Periop Services  Discharge Note  Short Stay    Procedure(s) (LRB):  HYSTERECTOMY, TOTAL, LAPAROSCOPIC   /  Stratafix  suture for vaginal cuff (N/A)      OUTCOME: Patient tolerated treatment/procedure well without complication and is met discharge criteria at approximately 1630.    DISPOSITION: Home or Self Care    FINAL DIAGNOSIS:  Pelvic congestion syndrome    FOLLOWUP: In clinic    DISCHARGE INSTRUCTIONS:    Discharge Procedure Orders   Diet general        TIME SPENT ON DISCHARGE: 5 minutes

## 2024-03-01 LAB — PSYCHE PATHOLOGY RESULT: NORMAL

## 2024-03-02 VITALS
HEART RATE: 64 BPM | BODY MASS INDEX: 20.65 KG/M2 | SYSTOLIC BLOOD PRESSURE: 116 MMHG | RESPIRATION RATE: 16 BRPM | OXYGEN SATURATION: 100 % | WEIGHT: 109.38 LBS | TEMPERATURE: 98 F | DIASTOLIC BLOOD PRESSURE: 73 MMHG | HEIGHT: 61 IN

## 2024-03-05 ENCOUNTER — TELEPHONE (OUTPATIENT)
Dept: OBSTETRICS AND GYNECOLOGY | Facility: HOSPITAL | Age: 49
End: 2024-03-05
Payer: COMMERCIAL

## 2024-03-05 NOTE — TELEPHONE ENCOUNTER
Phoned Waleska who is tolerating PO, voiding, having regular bowel movements and managing pain with ibuprofen and APAP.  She did not tolerate percocet due to nausea.  She is ambulatory and notes the intense pain felt prior to surgery is not present. Overall she is happy with results and feels only expected post operative discomfort.  She will gradually increase her activity and plan to keep the 6 week follow up and call for concerns or problems in the interim.

## 2024-04-11 ENCOUNTER — OFFICE VISIT (OUTPATIENT)
Dept: GYNECOLOGY | Facility: CLINIC | Age: 49
End: 2024-04-11
Payer: COMMERCIAL

## 2024-04-11 VITALS
HEIGHT: 61 IN | DIASTOLIC BLOOD PRESSURE: 78 MMHG | OXYGEN SATURATION: 100 % | SYSTOLIC BLOOD PRESSURE: 127 MMHG | HEART RATE: 77 BPM | BODY MASS INDEX: 20.92 KG/M2 | WEIGHT: 110.81 LBS | TEMPERATURE: 98 F

## 2024-04-11 DIAGNOSIS — Z98.890 POSTOPERATIVE STATE: Primary | ICD-10-CM

## 2024-04-11 PROCEDURE — 99024 POSTOP FOLLOW-UP VISIT: CPT | Mod: ,,, | Performed by: OBSTETRICS & GYNECOLOGY

## 2024-04-11 PROCEDURE — 3078F DIAST BP <80 MM HG: CPT | Mod: CPTII,,, | Performed by: OBSTETRICS & GYNECOLOGY

## 2024-04-11 PROCEDURE — 1159F MED LIST DOCD IN RCRD: CPT | Mod: CPTII,,, | Performed by: OBSTETRICS & GYNECOLOGY

## 2024-04-11 PROCEDURE — 99213 OFFICE O/P EST LOW 20 MIN: CPT | Mod: PBBFAC | Performed by: OBSTETRICS & GYNECOLOGY

## 2024-04-11 PROCEDURE — 3074F SYST BP LT 130 MM HG: CPT | Mod: CPTII,,, | Performed by: OBSTETRICS & GYNECOLOGY

## 2024-04-11 RX ORDER — PROGESTERONE 100 MG/1
CAPSULE ORAL
COMMUNITY
Start: 2024-03-21 | End: 2024-05-31

## 2024-04-11 NOTE — PROGRESS NOTES
Subjective:       Patient ID: Waleska Foley is a 49 y.o. female.    Chief Complaint:  7wk TLH f/u    History of Present Illness:  In for post op TLH visit.  She is improved but continues to have some rectal pressure and difficulty with stopping stream of urine.  She denies fever, N/V, vaginal bleeding.  She has been compliant with restricted activity instructions.  Path benign.    GYN & OB History  No LMP recorded. Patient has had an ablation.   Date of Last Pap: 2022    OB History    Para Term  AB Living   5 2     2 2   SAB IAB Ectopic Multiple Live Births   2       2      # Outcome Date GA Lbr Dain/2nd Weight Sex Delivery Anes PTL Lv   5 Para            4       CS-Unspec   KENAN   3 SAB            2 SAB            1 Para      CS-Unspec   KENAN       Past Medical History:   Diagnosis Date    Antiphospholipid syndrome     Pelvic pain     PONV (postoperative nausea and vomiting)     Thyroid disease       Past Surgical History:   Procedure Laterality Date    CHOLECYSTECTOMY      LAPAROSCOPIC TOTAL HYSTERECTOMY N/A 2024    Procedure: HYSTERECTOMY, TOTAL, LAPAROSCOPIC   /  Stratafix  suture for vaginal cuff;  Surgeon: Rigo Santos MD;  Location: [facility];  Service: OB/GYN;  Laterality: N/A;    REDUCTION OF BOTH BREASTS      Hysteroscopy, surgical; with endometrial ablation (eg, endometrial resection, electrosurgical ablation, thermoablation)      Laparoscopy with lysis of adhesions          Current Outpatient Medications:     ARMOUR THYROID 60 mg Tab, Take 60 mg by mouth before breakfast., Disp: , Rfl:     CMPD testosterone proprionate 2% in vanicream, Apply 10 mg topically Daily., Disp: , Rfl:     ondansetron (ZOFRAN) 4 MG tablet, Take 1 tablet (4 mg total) by mouth every 6 (six) hours as needed for Nausea., Disp: 12 tablet, Rfl: 1    oxyCODONE-acetaminophen (PERCOCET) 5-325 mg per tablet, Take 1 tablet by mouth every 4 (four) hours as needed for Pain., Disp: 15 tablet, Rfl: 0     progesterone (PROMETRIUM) 100 MG capsule, SMARTSI Capsule(s) By Mouth Every Evening (Patient not taking: Reported on 2024), Disp: , Rfl:      Review of patient's allergies indicates:   Allergen Reactions    Codeine Anaphylaxis    Sulfa (sulfonamide antibiotics) Anaphylaxis        Social History     Socioeconomic History    Marital status:    Tobacco Use    Smoking status: Never    Smokeless tobacco: Never   Substance and Sexual Activity    Alcohol use: Not Currently     Alcohol/week: 3.0 standard drinks of alcohol     Types: 3 Glasses of wine per week    Drug use: Never    Sexual activity: Not Currently     Partners: Male     Birth control/protection: None        Immunization History   Administered Date(s) Administered    Influenza - Quadrivalent - PF *Preferred* (6 months and older) 10/26/2015        There are no preventive care reminders to display for this patient.     Review of Systems  Review of Systems       Objective:     Vitals:    24 1340   BP: 127/78   Pulse: 77   Temp: 98.4 °F (36.9 °C)       Physical Exam:               Genitourinary:    Inguinal canal, vagina and rectum normal.   The external female genitalia was normal.     Labial bartholins normal.No bleeding in the vagina. Uterus is absent.    Genitourinary Comments: Suture palpable at right side of cuff. Appropriate tenderness.                          Assessment:        1. Postoperative state                Plan:      Problem List Items Addressed This Visit          Palliative Care    Postoperative state - Primary          Return to normal activity gradually. [place] OK in 10-14 days.  Anticipate continued improvement in bladder and rectal symptoms.    F/U for gyn annual 1 year

## 2024-05-30 ENCOUNTER — TELEPHONE (OUTPATIENT)
Dept: GYNECOLOGY | Facility: CLINIC | Age: 49
End: 2024-05-30
Payer: COMMERCIAL

## 2024-05-30 NOTE — TELEPHONE ENCOUNTER
----- Message from Ros Mendoza sent at 5/28/2024 10:10 AM CDT -----  The above called requesting to speak to Dr. Santos nurse...Pt states she has questions about her stitches post hyserectomy. Please advise, Thanks!

## 2024-05-30 NOTE — TELEPHONE ENCOUNTER
Pt voiced she is still feeling her stitches and it's been 13 weeks. Pt had sex with her  at 10 weeks and now is having pressure. Pt wants to be seen. Pt was put on schedule for 5/31/2024.

## 2024-05-31 ENCOUNTER — OFFICE VISIT (OUTPATIENT)
Dept: GYNECOLOGY | Facility: CLINIC | Age: 49
End: 2024-05-31
Payer: COMMERCIAL

## 2024-05-31 VITALS
TEMPERATURE: 99 F | DIASTOLIC BLOOD PRESSURE: 73 MMHG | BODY MASS INDEX: 21.14 KG/M2 | SYSTOLIC BLOOD PRESSURE: 111 MMHG | HEIGHT: 61 IN | WEIGHT: 112 LBS | HEART RATE: 85 BPM | RESPIRATION RATE: 12 BRPM

## 2024-05-31 DIAGNOSIS — Z98.890 POSTOPERATIVE STATE: Primary | ICD-10-CM

## 2024-05-31 PROCEDURE — 99213 OFFICE O/P EST LOW 20 MIN: CPT | Mod: PBBFAC | Performed by: OBSTETRICS & GYNECOLOGY

## 2024-05-31 PROCEDURE — 3078F DIAST BP <80 MM HG: CPT | Mod: CPTII,,, | Performed by: OBSTETRICS & GYNECOLOGY

## 2024-05-31 PROCEDURE — 1160F RVW MEDS BY RX/DR IN RCRD: CPT | Mod: CPTII,,, | Performed by: OBSTETRICS & GYNECOLOGY

## 2024-05-31 PROCEDURE — 99024 POSTOP FOLLOW-UP VISIT: CPT | Mod: ,,, | Performed by: OBSTETRICS & GYNECOLOGY

## 2024-05-31 PROCEDURE — 1159F MED LIST DOCD IN RCRD: CPT | Mod: CPTII,,, | Performed by: OBSTETRICS & GYNECOLOGY

## 2024-05-31 PROCEDURE — 3074F SYST BP LT 130 MM HG: CPT | Mod: CPTII,,, | Performed by: OBSTETRICS & GYNECOLOGY

## 2024-05-31 NOTE — PROGRESS NOTES
S/P [facility] 2/28.  She presents for suture trimming due to persistent suture causing problems with intercourse. Denies bleeding.  Her pelvic pain has resolved and bladder incontinence experienced immediately post op is significantly better.      Past Medical History:   Diagnosis Date    Antiphospholipid syndrome     Pelvic pain     PONV (postoperative nausea and vomiting)     Thyroid disease      Past Surgical History:   Procedure Laterality Date    CHOLECYSTECTOMY      LAPAROSCOPIC TOTAL HYSTERECTOMY N/A 2/28/2024    Procedure: HYSTERECTOMY, TOTAL, LAPAROSCOPIC   /  Stratafix  suture for vaginal cuff;  Surgeon: Rigo Santos MD;  Location: [facility];  Service: OB/GYN;  Laterality: N/A;    REDUCTION OF BOTH BREASTS      Hysteroscopy, surgical; with endometrial ablation (eg, endometrial resection, electrosurgical ablation, thermoablation)      Laparoscopy with lysis of adhesions       Current Outpatient Medications on File Prior to Visit   Medication Sig Dispense Refill    ARMOUR THYROID 60 mg Tab Take 60 mg by mouth before breakfast.      CMPD testosterone proprionate 2% in vanicream Apply 10 mg topically Daily.      ondansetron (ZOFRAN) 4 MG tablet Take 1 tablet (4 mg total) by mouth every 6 (six) hours as needed for Nausea. 12 tablet 1    [DISCONTINUED] progesterone (PROMETRIUM) 100 MG capsule       [DISCONTINUED] oxyCODONE-acetaminophen (PERCOCET) 5-325 mg per tablet Take 1 tablet by mouth every 4 (four) hours as needed for Pain. (Patient not taking: Reported on 5/31/2024) 15 tablet 0     No current facility-administered medications on file prior to visit.     Vitals:    05/31/24 0853   BP: 111/73   Pulse: 85   Resp: 12   Temp: 98.5 °F (36.9 °C)      PE    Pelvic:    Ext - NEFG    Vagina - suture present at right and left aspect of cuff.  Grasped with Hilda Clamp and removed without scissors. Small focus of granulation at right aspect of cuff cauterized with silver nitrate.  Patient tolerated procedure well.   Bimanual after suture removal - 2 mm suture palpable at left aspect of cuff - unable to grasp or visualize.      Plan - Advised that suture expected to fully resorb at 120 days 4 weeks from now.  Reassurance provided.  Activity as tolerated.

## 2024-07-31 ENCOUNTER — TELEPHONE (OUTPATIENT)
Dept: GYNECOLOGY | Facility: CLINIC | Age: 49
End: 2024-07-31
Payer: COMMERCIAL

## 2024-07-31 DIAGNOSIS — K62.89 RECTAL PAIN: Primary | ICD-10-CM

## 2024-07-31 NOTE — TELEPHONE ENCOUNTER
Called patient to inquire about her symptoms.     Patient states the rectum issues that she had been having previously are still there and is requesting to be referred to either one of the below doctors per her sister Christina Hartman, CHERISE recommendation.     C/o rectum pressure all the time. Nothing specifically exacerbates her symptoms. It is just constant discomfort in her rectum. Patient denies the bladder/pelvic pain that she was complaining of previously due to having the hysterectomy. Denies constipation or hemorrhoids. Admits to having one bowel movement everyday. Denies any changes in her actual BM's related to the rectum pressure she experiences.     Please advise if referral can be sent for the patient. Thank you.       ----- Message from Ros Mendoza sent at 7/31/2024  8:14 AM CDT -----  The above pt called requesting an referral to be sent to Dr. Garvey or Dr. Valadez. Pt states her rectum is swollen. Please advise, Thanks!

## 2024-08-01 ENCOUNTER — OFFICE VISIT (OUTPATIENT)
Dept: URGENT CARE | Facility: CLINIC | Age: 49
End: 2024-08-01
Payer: COMMERCIAL

## 2024-08-01 VITALS
OXYGEN SATURATION: 97 % | SYSTOLIC BLOOD PRESSURE: 129 MMHG | HEIGHT: 61 IN | RESPIRATION RATE: 20 BRPM | BODY MASS INDEX: 21.14 KG/M2 | HEART RATE: 85 BPM | TEMPERATURE: 99 F | WEIGHT: 112 LBS | DIASTOLIC BLOOD PRESSURE: 83 MMHG

## 2024-08-01 DIAGNOSIS — M53.3 SACRAL PAIN: Primary | ICD-10-CM

## 2024-08-01 RX ORDER — PROGESTERONE 100 MG/1
CAPSULE ORAL
COMMUNITY
Start: 2024-07-15

## 2024-08-01 RX ORDER — KETOROLAC TROMETHAMINE 30 MG/ML
30 INJECTION, SOLUTION INTRAMUSCULAR; INTRAVENOUS
Status: COMPLETED | OUTPATIENT
Start: 2024-08-01 | End: 2024-08-01

## 2024-08-01 RX ORDER — PREDNISONE 5 MG/1
5 TABLET ORAL DAILY
Qty: 5 TABLET | Refills: 0 | Status: SHIPPED | OUTPATIENT
Start: 2024-08-01 | End: 2024-08-06

## 2024-08-01 RX ADMIN — KETOROLAC TROMETHAMINE 30 MG: 30 INJECTION, SOLUTION INTRAMUSCULAR; INTRAVENOUS at 04:08

## 2024-08-01 NOTE — PATIENT INSTRUCTIONS
May continue over-the-counter medications as needed for mild-to-moderate pain Tylenol.  Recommend donut pillow for sacrum pressure relief.  May add prednisone to help reduce pain and inflammation.  Recommend follow-up with gynecologist to review concern for sacral pelvic injury further evaluation and continued care planning.  Recommended emergency department evaluation sooner if sacral/pelvic pain worsens.

## 2024-08-01 NOTE — PROGRESS NOTES
"Subjective:      Patient ID: Waleska Foley is a 49 y.o. female.    Vitals:  height is 5' 1" (1.549 m) and weight is 50.8 kg (112 lb). Her oral temperature is 98.6 °F (37 °C). Her blood pressure is 129/83 and her pulse is 85. Her respiration is 20 and oxygen saturation is 97%.     Chief Complaint: Tailbone Pain    Female reports 1 week ago with accidental mechanical slip and fall wet steps striking sacrum on falling directly against wooden step having persistent sacral pain over the past week sensitive without bruising redness swelling or visible wound presents to urgent Care for evaluation.        Constitution: Negative for fever.   Genitourinary:  Positive for pelvic pain.   Musculoskeletal:  Positive for trauma.   Skin:  Negative for wound, abrasion, laceration, skin thickening/induration, erythema and bruising.      Objective:     Physical Exam   Constitutional:  Non-toxic appearance.      Comments:Awake alert ambulatory thin female     Cardiovascular: Normal pulses.   Musculoskeletal: Normal range of motion.         General: Normal range of motion.      Right hip: Normal.      Left hip: Normal.        Back:    Neurological: no focal deficit. She is alert. She displays no weakness. Gait normal.   Skin: Skin is warm, dry and not diaphoretic. Capillary refill takes less than 2 seconds. No bruising and No erythema   Psychiatric: Her mood appears anxious. chaperone present (female TEOFILO Li)          Previous History      Review of patient's allergies indicates:   Allergen Reactions    Codeine Anaphylaxis    Sulfa (sulfonamide antibiotics) Anaphylaxis       Past Medical History:   Diagnosis Date    Antiphospholipid syndrome     Pelvic pain     PONV (postoperative nausea and vomiting)     Thyroid disease      Current Outpatient Medications   Medication Instructions    ARMOUR THYROID 60 mg, Oral, Before breakfast    CMPD testosterone proprionate 2% in vanicream 10 mg, Daily    ondansetron (ZOFRAN) 4 mg, Oral, " "Every 6 hours PRN    predniSONE (DELTASONE) 5 mg, Oral, Daily    progesterone (PROMETRIUM) 100 MG capsule SMARTSI Capsule(s) By Mouth Every Evening     Past Surgical History:   Procedure Laterality Date    CHOLECYSTECTOMY      LAPAROSCOPIC TOTAL HYSTERECTOMY N/A 2024    Procedure: HYSTERECTOMY, TOTAL, LAPAROSCOPIC   /  Stratafix  suture for vaginal cuff;  Surgeon: Rigo Santos MD;  Location: [facility] OR;  Service: OB/GYN;  Laterality: N/A;    REDUCTION OF BOTH BREASTS      Hysteroscopy, surgical; with endometrial ablation (eg, endometrial resection, electrosurgical ablation, thermoablation)      Laparoscopy with lysis of adhesions       Family History   Problem Relation Name Age of Onset    Breast cancer Mother      Cystic fibrosis Mother      No Known Problems Father      No Known Problems Sister      No Known Problems Brother         Social History     Tobacco Use    Smoking status: Never    Smokeless tobacco: Never   Substance Use Topics    Alcohol use: Not Currently     Alcohol/week: 3.0 standard drinks of alcohol     Types: 3 Glasses of wine per week    Drug use: Never        Physical Exam      Vital Signs Reviewed   /83 (BP Location: Right arm)   Pulse 85   Temp 98.6 °F (37 °C) (Oral)   Resp 20   Ht 5' 1" (1.549 m)   Wt 50.8 kg (112 lb)   SpO2 97%   BMI 21.16 kg/m²        Procedures    Procedures     Labs     Results for orders placed or performed during the hospital encounter of 24   POCT urine pregnancy   Result Value Ref Range    POC Preg Test, Ur Negative Negative     Acceptable Yes    Specimen to Pathology   Result Value Ref Range    Pathology Result       XR SACRUM AND COCCYX  Order: [id number]  Status: Final result       Visible to patient: Yes (not seen)       Next appt: 2025 at 01:00 PM in Gynecology (Rigo Santos MD)       Dx: Sacral pain    1 Result Note  Details    Reading Physician Reading Date Result Priority   Ruperto Tang, " MD  [phone] 8/1/2024 STAT     Narrative & Impression  EXAMINATION:  Sacrum and coccyx three views     CLINICAL HISTORY:  Trauma, fall, injury, pain     COMPARISON:  None     FINDINGS:  Evaluation of the sacrum and coccyx is partially obscured on AP view secondary to overlying bowel and stool.  There is no definite fracture seen.  Alignment appears normal.     Impression:     No acute osseous finding.        Electronically signed by:Ruperto Tang MD       Assessment:     1. Sacral pain        Plan:   Discussed high concern for sacral contusion with patient in clinic today.  Discussed radiologist's x-ray final report with no acute fracture appreciated.  Patient reports avoiding oral NSAIDs due to IBS history.  Patient encouraged OTC Tylenol use in addition to sacral support and encouraged follow-up.  Patient reports will follow-up with her gynecologist if sacral pelvic pain persist potential for outpatient CT imaging.  Patient reports having allergy to narcotic pain medications accepts prednisone alternate medication for pain and inflammation option.    May continue over-the-counter medications as needed for mild-to-moderate pain Tylenol.  Recommend donut pillow for sacrum pressure relief.  Recommend follow-up with gynecologist to review concern for sacral pelvic injury further evaluation and continued care planning.  Recommended emergency department evaluation sooner if sacral/pelvic pain worsens.    Sacral pain  -     XR SACRUM AND COCCYX; Future; Expected date: 08/01/2024    Other orders  -     ketorolac injection 30 mg  -     predniSONE (DELTASONE) 5 MG tablet; Take 1 tablet (5 mg total) by mouth once daily. for 5 days  Dispense: 5 tablet; Refill: 0

## 2024-08-02 ENCOUNTER — TELEPHONE (OUTPATIENT)
Dept: GYNECOLOGY | Facility: CLINIC | Age: 49
End: 2024-08-02
Payer: COMMERCIAL

## 2024-08-02 RX ORDER — ESTRADIOL 0.1 MG/G
1 CREAM VAGINAL NIGHTLY
Qty: 42.5 G | Refills: 1 | Status: SHIPPED | OUTPATIENT
Start: 2024-08-02 | End: 2025-08-02

## 2024-08-02 NOTE — TELEPHONE ENCOUNTER
Right dyspareunia. S/P hysterectomy 2/24.  Will rx estrace cream and follow up in 6 weeks if no improvement

## 2025-02-19 RX ORDER — PROGESTERONE 100 MG/1
200 CAPSULE ORAL NIGHTLY
Qty: 60 CAPSULE | Refills: 9 | Status: SHIPPED | OUTPATIENT
Start: 2025-02-19

## (undated) DEVICE — GLOVE SIGNATURE ESSNTL LTX 7.5

## (undated) DEVICE — DEVICE CLSR PDS 0 CT-1 12IN

## (undated) DEVICE — SUT VICRYL+ 27 UR-6 VIOL

## (undated) DEVICE — IRRIGATION SET Y-TYPE TUR/BLAD

## (undated) DEVICE — IRRIGATOR SUCTION W/TIP

## (undated) DEVICE — ADHESIVE MASTISOL VIAL 48/BX

## (undated) DEVICE — POUCH INSTRUMENT ADH 10X18IN

## (undated) DEVICE — OCCLUDER COLPO-PNEUMO STERILE

## (undated) DEVICE — SUT MCRYL PLUS 4-0 PS2 27IN

## (undated) DEVICE — NDL HYPO REG 25G X 1 1/2

## (undated) DEVICE — CLAMP LAPAROSHIELD MALE 45CM

## (undated) DEVICE — LUBRICANT SURGILUBE 2 OZ

## (undated) DEVICE — SOL IRR WATER STRL 3000 ML

## (undated) DEVICE — COVER TABLE HVY DTY 60X90IN

## (undated) DEVICE — SYR 10CC LUER LOCK

## (undated) DEVICE — TRAY SKIN SCRUB WET PREMIUM

## (undated) DEVICE — TRAY CATH FOL SIL DRN BAG 16FR

## (undated) DEVICE — TUBING INSUFFLATOR W/ROT CONCT

## (undated) DEVICE — SPONGE LAP 18X18 PREWASHED

## (undated) DEVICE — GLOVE SENSICARE PI SURG 6.5

## (undated) DEVICE — KIT PINK PAD EXT BODY STRP SHT

## (undated) DEVICE — BLANKET SNUGGLE WARM ADLT SM

## (undated) DEVICE — TIP RUMI MANIPULATOR LAVENDER

## (undated) DEVICE — CLOSURE SKIN STERI STRIP 1/2X4

## (undated) DEVICE — GLOVE SENSICARE PI GRN 6.5

## (undated) DEVICE — PACK GYN LAPAROSCOPY HZD

## (undated) DEVICE — SOL NACL IRR 1000ML BTL

## (undated) DEVICE — SUT PROLENE 0 CT 30 IN BLUE

## (undated) DEVICE — WATER STRL IRR USP UROMTC 1000

## (undated) DEVICE — TROCAR ENDOPATH XCEL 11MM 10CM

## (undated) DEVICE — SHEARS HARMONIC CRVD TIP 36CM